# Patient Record
Sex: FEMALE | Race: BLACK OR AFRICAN AMERICAN
[De-identification: names, ages, dates, MRNs, and addresses within clinical notes are randomized per-mention and may not be internally consistent; named-entity substitution may affect disease eponyms.]

---

## 2019-03-07 ENCOUNTER — HOSPITAL ENCOUNTER (INPATIENT)
Dept: HOSPITAL 74 - JDEL | Age: 24
LOS: 5 days | Discharge: HOME | DRG: 540 | End: 2019-03-12
Attending: OBSTETRICS & GYNECOLOGY | Admitting: OBSTETRICS & GYNECOLOGY
Payer: COMMERCIAL

## 2019-03-07 VITALS — BODY MASS INDEX: 31 KG/M2

## 2019-03-07 DIAGNOSIS — Z3A.32: ICD-10-CM

## 2019-03-07 DIAGNOSIS — O30.033: ICD-10-CM

## 2019-03-07 LAB
BASOPHILS # BLD: 0.3 % (ref 0–2)
DEPRECATED RDW RBC AUTO: 15 % (ref 11.6–15.6)
EOSINOPHIL # BLD: 1 % (ref 0–4.5)
HCT VFR BLD CALC: 33.1 % (ref 32.4–45.2)
HGB BLD-MCNC: 10.9 GM/DL (ref 10.7–15.3)
LYMPHOCYTES # BLD: 13.2 % (ref 8–40)
MCH RBC QN AUTO: 30 PG (ref 25.7–33.7)
MCHC RBC AUTO-ENTMCNC: 33 G/DL (ref 32–36)
MCV RBC: 91.1 FL (ref 80–96)
MONOCYTES # BLD AUTO: 8.2 % (ref 3.8–10.2)
NEUTROPHILS # BLD: 77.3 % (ref 42.8–82.8)
PLATELET # BLD AUTO: 150 K/MM3 (ref 134–434)
PMV BLD: 11 FL (ref 7.5–11.1)
RBC # BLD AUTO: 3.63 M/MM3 (ref 3.6–5.2)
WBC # BLD AUTO: 11.8 K/MM3 (ref 4–10)

## 2019-03-07 RX ADMIN — BETAMETHASONE ACETATE AND BETAMETHASONE SODIUM PHOSPHATE SCH MG: 3; 3 INJECTION, SUSPENSION INTRA-ARTICULAR; INTRALESIONAL; INTRAMUSCULAR; SOFT TISSUE at 23:20

## 2019-03-07 NOTE — HP
Past Medical History





- Admission


Chief Complaint: back pain, vaginal bleeding, fall earlier this week


History of Present Illness: 





22 yo  with mono di twins at 32.1 weeks c/o on and off vaginal bleeding 

throughout pregnancy that restarted today.  she also c/o back pain and a fall 

earlier this werk on ice.  no lof/ctx.  +fm


History Source: Patient, Medical Record


Limitations to Obtaining History: No Limitations





- Past Medical History


Cardiovascular: No: HTN


Pulmonary: No: COPD


...: 1


... Weeks Gestation by Dates: 32.1


Infectious Disease: No: HIV, MRSA, STD's


Psych: No: Bipolar, Depression


Endocrine: Yes: Hypothyroidism





- Past Surgical History


Past Surgical History: Yes: None


Hx Myomectomy: No


Hx Transabdominal Cerclage: No





- Smoking History


Have you smoked in the past 12 months: No





- Alcohol/Substance Use


History of Substance Use: reports: None





- Social History


History of Recent Travel: No





Home Medications





- Allergies


Allergies/Adverse Reactions: 


 Allergies











Allergy/AdvReac Type Severity Reaction Status Date / Time


 


No Known Allergies Allergy   Verified 19 23:15














Review of Systems





- Review of Systems


Constitutional: reports: No Symptoms


Eyes: reports: No Symptoms


HENT: reports: No Symptoms


Neck: reports: No Symptoms


Cardiovascular: reports: No Symptoms


Respiratory: reports: No Symptoms


Gastrointestinal: reports: No Symptoms


Genitourinary: reports: Vaginal Bleeding


Breasts: reports: No Symptoms Reported


Musculoskeletal: reports: No Symptoms


Integumentary: reports: No Symptoms


Neurological: reports: No Symptoms


Endocrine: reports: No Symptoms


Hematology/Lymphatic: reports: No Symptoms


Psychiatric: reports: No Symptoms





Physical Exam - Maternity


Constitutional: Yes: Well Nourished, No Distress, Calm


Eyes: Yes: Conjunctiva Clear, EOM Intact


HENT: Yes: Normocephalic


Neck: Yes: Supple


Cardiovascular: Yes: Regular Rate and Rhythm


Lungs: Clear to auscultation





- Abdominal Exam/OB


Number of Fetuses: Multiple


Category: I





- Vaginal Exam/OB


Dilatation (cm): 1


Effacement (%): 90


Fetal Presentation: Vertex/Position (exam per nursing staff)


Fetal Station: -3





- Physical Exam


Psychiatric: Yes: Alert, Oriented





Hemorrhage Risk Assessment





- Risk Factors


Medium Risk Factors: Yes: None


High Risk Factors: Yes: None


Risk Score: 1


Risk Level: Medium Risk





Problem List





- Problems


(1) Monochorionic diamniotic twin gestation


Code(s): O30.039 - TWIN PREGNANCY, MONOCHORIONIC/DIAMNIOTIC, UNSP TRIMESTER   





Assessment/Plan





mono/di twins at 32.1 weeks   labor 


admit to l&d


steroids for fetal lung maturity


gbs prophylaxis


possible ROM to get ultrasound (nitrizine not accurate due to VB) for flako to 

eval for possible PPROM


continuous monitoring


npo for now

## 2019-03-08 LAB
ANION GAP SERPL CALC-SCNC: 8 MMOL/L (ref 8–16)
APTT BLD: 27.8 SECONDS (ref 25.2–36.5)
ARTERIAL BLOOD GAS PCO2: 53 MMHG (ref 35–45)
ARTERIAL BLOOD GAS PCO2: 54.5 MMHG (ref 35–45)
BASE EXCESS BLDA CALC-SCNC: -3.7 MEQ/L (ref -2–2)
BASE EXCESS BLDA CALC-SCNC: -7.4 MEQ/L (ref -2–2)
BASOPHILS # BLD: 0.2 % (ref 0–2)
BUN SERPL-MCNC: 4 MG/DL (ref 7–18)
CALCIUM SERPL-MCNC: 8.3 MG/DL (ref 8.5–10.1)
CHLORIDE SERPL-SCNC: 107 MMOL/L (ref 98–107)
CO2 SERPL-SCNC: 23 MMOL/L (ref 21–32)
CREAT SERPL-MCNC: 0.4 MG/DL (ref 0.55–1.3)
DEPRECATED RDW RBC AUTO: 15.2 % (ref 11.6–15.6)
EOSINOPHIL # BLD: 0.1 % (ref 0–4.5)
GLUCOSE SERPL-MCNC: 91 MG/DL (ref 74–106)
HCT VFR BLD CALC: 32.6 % (ref 32.4–45.2)
HGB BLD-MCNC: 10.9 GM/DL (ref 10.7–15.3)
INR BLD: 0.95 (ref 0.83–1.09)
LYMPHOCYTES # BLD: 5.3 % (ref 8–40)
MCH RBC QN AUTO: 30.4 PG (ref 25.7–33.7)
MCHC RBC AUTO-ENTMCNC: 33.3 G/DL (ref 32–36)
MCV RBC: 91.1 FL (ref 80–96)
MONOCYTES # BLD AUTO: 5.1 % (ref 3.8–10.2)
NEUTROPHILS # BLD: 89.3 % (ref 42.8–82.8)
PH BLDV: 7.24 [PH] (ref 7.32–7.42)
PH BLDV: 7.25 [PH] (ref 7.32–7.42)
PLATELET # BLD AUTO: 167 K/MM3 (ref 134–434)
PMV BLD: 12 FL (ref 7.5–11.1)
PO2 BLDA: 18 MMHG (ref 80–100)
PO2 BLDA: 18.7 MMHG (ref 80–100)
POTASSIUM SERPLBLD-SCNC: 3.5 MMOL/L (ref 3.5–5.1)
PT PNL PPP: 11.2 SEC (ref 9.7–13)
RBC # BLD AUTO: 3.58 M/MM3 (ref 3.6–5.2)
SAO2 % BLDA: 17.6 % (ref 90–98.9)
SAO2 % BLDA: 20.6 % (ref 90–98.9)
SODIUM SERPL-SCNC: 138 MMOL/L (ref 136–145)
VENOUS PC02: 58.8 MMHG (ref 38–52)
VENOUS PC02: 59.3 MMHG (ref 38–52)
VENOUS PO2: 12.7 MMHG (ref 28–48)
VENOUS PO2: 15 MMHG (ref 28–48)
WBC # BLD AUTO: 19.8 K/MM3 (ref 4–10)

## 2019-03-08 RX ADMIN — Medication SCH MLS/HR: at 11:00

## 2019-03-08 RX ADMIN — Medication SCH MLS/HR: at 01:00

## 2019-03-08 RX ADMIN — FERROUS SULFATE TAB EC 324 MG (65 MG FE EQUIVALENT) SCH MG: 324 (65 FE) TABLET DELAYED RESPONSE at 23:59

## 2019-03-08 RX ADMIN — LEVOTHYROXINE SODIUM SCH MCG: 25 TABLET ORAL at 06:13

## 2019-03-08 RX ADMIN — FERROUS SULFATE TAB EC 324 MG (65 MG FE EQUIVALENT) SCH MG: 324 (65 FE) TABLET DELAYED RESPONSE at 10:48

## 2019-03-08 NOTE — OP
Operative Note





- Note:


Operative Date: 19


Pre-Operative Diagnosis: mono/di twin IUP at 32.1 weeks, vaginal bleeding, 

 labor


Operation: primary LTCS


Findings: 





live female  X 2





Post-Operative Diagnosis: Same as Pre-op


Surgeon: Savita Marie


Assistant: Miguel Angel Tovar


Anesthesiologist/CRNA: Jatinder Brandon


Anesthesia: Spinal


Specimens Removed: placenta, fetal cord blood gases, fetal cord blood


Estimated Blood Loss (mls): 600 (unknown amt of blood loss prior to procedure (

estimated 250cc prior to delivery))


Operative Report Dictated: Yes

## 2019-03-08 NOTE — PN
Ante-Partal Exam





- Subjective


Subjective: 





Pt with continued VB.


Laborist evaluation of pt showed cevical dilation 4cm and significant VB. 


Bleeding: Yes


Bleeding Description: Moderate


Headache: No


Visual changes: No


Right upper quadrant pain: No


Pain (scale 1-10): 4





- Contractions


Contractions: Yes


Regularity: Regular


Intensity: Mod/Strong


Fetal Monitor Mode: External





- Exam during Labor


Fetal Heart Rate: 150 (for both babies)


Variability: Moderate


Category: I


Fetal Monitor Accelerations: Present


Fetal Monitor Decelerations: None


Exam: Vaginal


Dilatation (cm): 4


Fetal Presentation: Vertex


Remarks: 





exam per laborist at  hospital that evening





- Assessment/Plan


Assessment/Plan: 





22 y/o  with mono di twin IUP at 32.1 weeks, active  labor and 

moderate vaginal bleeding


plan for c section


NPO


Silva


anesthesia/neonatology aware


consents signed

## 2019-03-08 NOTE — OP
DATE OF OPERATION:  2019

 

PREOPERATIVE DIAGNOSIS:  Monochorionic-diamniotic twin intrauterine pregnancy 
at 32.1

weeks' gestation,  contractions,  labor, vaginal bleeding.

 

POSTOPERATIVE DIAGNOSIS:  Monochorionic-diamniotic twin intrauterine pregnancy 
at

32.1 weeks' gestation,  contractions,  labor, vaginal bleeding.

 

PROCEDURE:  Primary low transverse  section.

 

SURGEON:  Savita Marie DO

 

ANESTHESIA:  Spinal by Dr. Brandon.

 

ASSISTANT:  MARY ANNE Cano

 

COMPLICATIONS:  None.

 

ESTIMATED BLOOD LOSS:  600 mL 

 

FINDINGS: Live female infant x2.

 

SPONGE, NEEDLE, AND INSTRUMENT COUNT:  Correct.

 

DISPOSITION:  Stable to PACU.

 

BRIEF HISTORY AND PROCEDURE:  The patient is a 23-year-old  female at 32.1

weeks' gestation with a monochorionic-diamniotic twin intrauterine pregnancy who

arrived to Labor and Delivery with complaints of back pain and vaginal 
bleeding.  The

patient had been examined and found to be 1 cm dilated at that time.  The 
patient was

rick every 1 to 2 minutes.  After IV fluids were started, a dose of

betamethasone was given and an ultrasound examination showed fetus A in the 
vertex

position and normal PINEDA for both fetuses.  The patient continued to contract

painfully and found to be 4 cm dilated approximately 2 hours after admission at 
which

point it was decided to proceed with a  delivery secondary to  
labor

and the vaginal bleeding which appeared to be moderate at this time.  It did not

appear that tocolysis would be a safe option due to the vaginal bleeding.  The 
patient was counseled on her

options and she agreed with the  delivery and she signed consents for 
the

procedure.

 

She was then taken back to the operating room and given spinal anesthesia by Dr. Brandon.  She was placed in the dorsal supine position.  A Silva catheter was

placed under sterile conditions.  She was prepped and draped in the usual 
sterile

fashion.  A hard timeout was performed.  A Pfannenstiel skin incision was 
created in

the skin using a scalpel and carried down to the underlying layer of rectus 
fascia

sharply.  The fascia was incised on either side of the midline sharply and the

fascial incision was extended in superolateral direction sharply.  The fascia 
was

tented upward and dissected off the underlying layer of rectus muscle sharply.  
The

musculature was identified in the midline and retracted laterally and the 
peritoneum

was entered bluntly and carefully dissected to allow for adequate room for fetal

delivery.  A transverse incision was created in the lower uterine segment which 
was

extended in the superolateral direction bluntly.  The infant A was delivered in 
cephalic 

presentation  from left occiput transverse position without difficulty.  The 
cord was clamped twice and

cut between.  The infant was taken to the neonatology staff who were present 
for the

entire delivery.  Apgar scores of 8 and 9 were given.  Attention was then 
turned to

fetus B.  The amniotic sac was ruptured and appeared to be transverse in lie.  
Fetal

feet were able to be palpated and elevated out of the abdomen for a footling 
breach

delivery.  After elevation of the fetal feet, the baby's left arm delivered 
with ease

and slight fundal pressure was given in order to deliver up to the level of the 
axilla.  At this point, the

fetus's right arm was carefully swept across the chest anteriorly in a flexed 
position for

delivery and the fetal head was delivered in a flexed position with gentle 
traction

and fundal pressure.  The cord was clamped twice and cut in between.  The

infant was taken over to the neonatology staff where Apgar scores of 7 and 9 
were

given.  The placenta was then delivered intact.  Cord blood gases were 
collected. 

The uterus was exteriorized from the abdomen.  The hysterotomy was 
reapproximated

using 1 Vicryl in a running locked fashion, second layer using 0 Biosyn in a 
running

locked fashion.  Excellent hemostasis was achieved.  Bilateral tubes and 
ovaries were

noted to be normal.  The posterior cul-de-sac was suctioned.  The uterus was 
placed

back into the abdomen.  Bilateral gutters were inspected and cleared of all 
amniotic

membrane, blood clot and debris.  The hysterotomy was again noted to be 
hemostatic. 

The peritoneum was reapproximated using 2-0 chromic in a running fashion.  The

musculature was reapproximated using two interrupted sutures of 0 Biosyn.  The 
fascia

was reapproximated using 1 Vicryl in a running fashion.  The subcutaneous 
tissue was

reapproximated with 1 Vicryl in a running fashion.  The skin was reapproximated 
in

subcuticular fashion using 3-0 Vicryl.  Steri-Strips were then applied.  The 
patient

tolerated the procedure well and recovering in stable condition in the PACU at 
this

time.

 

Sponge, needle and instrument counts were reported correct at the end of the 
case. 

 

SAVITA MARIE DO

 

/[id number]

DD: 2019 01:44

DT: 2019 06:54

Job #:  [id number]

MTDD

## 2019-03-08 NOTE — PN
Progress Note (short form)





- Note


Progress Note: 





Anesthesia post op note,


S/p  for zenobia labor, with twins, under spinal anesthesia with 

duramorph. 


Pat seen and examined. ambulating. Vss. No complaints. VSS. No apparent post 

anesthesia complications.

## 2019-03-09 LAB
ALBUMIN SERPL-MCNC: 2.9 G/DL (ref 3.4–5)
ALP SERPL-CCNC: 115 U/L (ref 45–117)
ALT SERPL-CCNC: 14 U/L (ref 13–61)
ANION GAP SERPL CALC-SCNC: 7 MMOL/L (ref 8–16)
AST SERPL-CCNC: 17 U/L (ref 15–37)
BASOPHILS # BLD: 0.2 % (ref 0–2)
BILIRUB SERPL-MCNC: 0.6 MG/DL (ref 0.2–1)
BUN SERPL-MCNC: 6 MG/DL (ref 7–18)
CALCIUM SERPL-MCNC: 8.4 MG/DL (ref 8.5–10.1)
CHLORIDE SERPL-SCNC: 106 MMOL/L (ref 98–107)
CO2 SERPL-SCNC: 25 MMOL/L (ref 21–32)
CREAT SERPL-MCNC: 0.7 MG/DL (ref 0.55–1.3)
DEPRECATED RDW RBC AUTO: 14.7 % (ref 11.6–15.6)
EOSINOPHIL # BLD: 0.2 % (ref 0–4.5)
GLUCOSE SERPL-MCNC: 86 MG/DL (ref 74–106)
HCT VFR BLD CALC: 30.4 % (ref 32.4–45.2)
HGB BLD-MCNC: 10.1 GM/DL (ref 10.7–15.3)
LYMPHOCYTES # BLD: 10.8 % (ref 8–40)
MCH RBC QN AUTO: 29.9 PG (ref 25.7–33.7)
MCHC RBC AUTO-ENTMCNC: 33.3 G/DL (ref 32–36)
MCV RBC: 89.8 FL (ref 80–96)
MONOCYTES # BLD AUTO: 6.9 % (ref 3.8–10.2)
NEUTROPHILS # BLD: 81.9 % (ref 42.8–82.8)
PLATELET # BLD AUTO: 157 K/MM3 (ref 134–434)
PMV BLD: 11 FL (ref 7.5–11.1)
POTASSIUM SERPLBLD-SCNC: 3.8 MMOL/L (ref 3.5–5.1)
PROT SERPL-MCNC: 6.1 G/DL (ref 6.4–8.2)
RBC # BLD AUTO: 3.38 M/MM3 (ref 3.6–5.2)
SODIUM SERPL-SCNC: 137 MMOL/L (ref 136–145)
URATE SERPL-SCNC: 4.6 MG/DL (ref 2.6–7.2)
WBC # BLD AUTO: 17.4 K/MM3 (ref 4–10)

## 2019-03-09 RX ADMIN — ACETAMINOPHEN PRN MG: 325 TABLET ORAL at 03:38

## 2019-03-09 RX ADMIN — FERROUS SULFATE TAB EC 324 MG (65 MG FE EQUIVALENT) SCH MG: 324 (65 FE) TABLET DELAYED RESPONSE at 21:22

## 2019-03-09 RX ADMIN — SIMETHICONE CHEW TAB 80 MG PRN MG: 80 TABLET ORAL at 21:22

## 2019-03-09 RX ADMIN — BETAMETHASONE ACETATE AND BETAMETHASONE SODIUM PHOSPHATE SCH: 3; 3 INJECTION, SUSPENSION INTRA-ARTICULAR; INTRALESIONAL; INTRAMUSCULAR; SOFT TISSUE at 00:30

## 2019-03-09 RX ADMIN — ACETAMINOPHEN PRN MG: 325 TABLET ORAL at 21:22

## 2019-03-09 RX ADMIN — FERROUS SULFATE TAB EC 324 MG (65 MG FE EQUIVALENT) SCH MG: 324 (65 FE) TABLET DELAYED RESPONSE at 09:24

## 2019-03-09 RX ADMIN — DOCUSATE SODIUM,SENNOSIDES PRN TABLET: 50; 8.6 TABLET, FILM COATED ORAL at 22:01

## 2019-03-09 RX ADMIN — SIMETHICONE CHEW TAB 80 MG PRN MG: 80 TABLET ORAL at 10:06

## 2019-03-09 RX ADMIN — LABETALOL HCL SCH MG: 100 TABLET, FILM COATED ORAL at 14:07

## 2019-03-09 RX ADMIN — LABETALOL HCL SCH MG: 100 TABLET, FILM COATED ORAL at 21:22

## 2019-03-09 RX ADMIN — LEVOTHYROXINE SODIUM SCH MCG: 25 TABLET ORAL at 06:32

## 2019-03-09 RX ADMIN — ACETAMINOPHEN PRN MG: 325 TABLET ORAL at 10:06

## 2019-03-09 RX ADMIN — SIMETHICONE CHEW TAB 80 MG PRN MG: 80 TABLET ORAL at 03:37

## 2019-03-09 NOTE — PN
Post Partum Progress Note





- Subjective


Subjective: 





22 yo Para 1 status post primary , seen and evaluated.


She c/o gas pain. Dressing is intact.


She denies any headache, blurry vision nor epigastric pain.


Post Partum Day: 1


Type of Delivery: Primary C/S


Vital Signs: 


 Vital Signs











Temperature  98.7 F   19 07:30


 


Pulse Rate  93 H  19 07:30


 


Respiratory Rate  18   19 07:30


 


Blood Pressure  153/98   19 07:30


 


O2 Sat by Pulse Oximetry (%)  98   19 02:45











Breast Exam: Yes: Soft


Uterus: Yes: Fundus @ umbilicus


Incision: Yes: Dressing dry and intact


Abdomen/GI: Yes: Abdominal Distention


Lochia: Yes: Rubra


Lochia, amount: Small


Extremities: Yes: Calves non-tender


Activity: Other (She's out of bed to chair)





- Labs


Labs: 


 CBC











WBC  17.4 K/mm3 (4.0-10.0)  H  19  07:00    


 


RBC  3.38 M/mm3 (3.60-5.2)  L  19  07:00    


 


Hgb  10.1 GM/dL (10.7-15.3)  L  19  07:00    


 


Hct  30.4 % (32.4-45.2)  L  19  07:00    


 


MCV  89.8 fl (80-96)   19  07:00    


 


MCH  29.9 pg (25.7-33.7)   19  07:00    


 


MCHC  33.3 g/dl (32.0-36.0)   19  07:00    


 


RDW  14.7 % (11.6-15.6)   19  07:00    


 


Plt Count  157 K/MM3 (134-434)   19  07:00    


 


MPV  11.0 fl (7.5-11.1)   19  07:00    


 


Absolute Neuts (auto)  14.2 K/mm3 (1.5-8.0)  H  19  07:00    


 


Neutrophils %  81.9 % (42.8-82.8)   19  07:00    


 


Lymphocytes %  10.8 % (8-40)  D 19  07:00    


 


Monocytes %  6.9 % (3.8-10.2)   19  07:00    


 


Eosinophils %  0.2 % (0-4.5)  D 19  07:00    


 


Basophils %  0.2 % (0-2.0)   19  07:00    


 


Nucleated RBC %  0 % (0-0)   19  07:00    














Problem List





- Problems


(1) Status post primary low transverse  section


Code(s): Z98.891 - HISTORY OF UTERINE SCAR FROM PREVIOUS SURGERY   





Assessment/Plan





Status post 


Ambulation


R/O PIH


Labetalol 


Analgesia as needed

## 2019-03-10 RX ADMIN — FERROUS SULFATE TAB EC 324 MG (65 MG FE EQUIVALENT) SCH MG: 324 (65 FE) TABLET DELAYED RESPONSE at 21:01

## 2019-03-10 RX ADMIN — LABETALOL HCL SCH MG: 100 TABLET, FILM COATED ORAL at 14:04

## 2019-03-10 RX ADMIN — LABETALOL HCL SCH MG: 100 TABLET, FILM COATED ORAL at 06:22

## 2019-03-10 RX ADMIN — LEVOTHYROXINE SODIUM SCH MCG: 25 TABLET ORAL at 06:22

## 2019-03-10 RX ADMIN — DOCUSATE SODIUM,SENNOSIDES PRN TABLET: 50; 8.6 TABLET, FILM COATED ORAL at 21:02

## 2019-03-10 RX ADMIN — ACETAMINOPHEN PRN MG: 325 TABLET ORAL at 06:23

## 2019-03-10 RX ADMIN — ACETAMINOPHEN PRN MG: 325 TABLET ORAL at 13:37

## 2019-03-10 RX ADMIN — SIMETHICONE CHEW TAB 80 MG PRN MG: 80 TABLET ORAL at 21:00

## 2019-03-10 RX ADMIN — SIMETHICONE CHEW TAB 80 MG PRN MG: 80 TABLET ORAL at 13:37

## 2019-03-10 RX ADMIN — SIMETHICONE CHEW TAB 80 MG PRN MG: 80 TABLET ORAL at 06:22

## 2019-03-10 RX ADMIN — LABETALOL HCL SCH MG: 100 TABLET, FILM COATED ORAL at 21:01

## 2019-03-10 RX ADMIN — ACETAMINOPHEN PRN MG: 325 TABLET ORAL at 21:00

## 2019-03-10 RX ADMIN — FERROUS SULFATE TAB EC 324 MG (65 MG FE EQUIVALENT) SCH MG: 324 (65 FE) TABLET DELAYED RESPONSE at 09:05

## 2019-03-10 NOTE — PN
Post Partum Progress Note





- Subjective


Subjective: 





24 yo Para 1 status post primary , seen and evaluated.


Doing well.


Post Partum Day: 2


Type of Delivery: Primary C/S


Vital Signs: 


 Vital Signs











Temperature  98.8 F   03/10/19 01:57


 


Pulse Rate  96 H  03/10/19 06:24


 


Respiratory Rate  18   03/10/19 06:24


 


Blood Pressure  121/96   03/10/19 06:24


 


O2 Sat by Pulse Oximetry (%)  98   19 21:42











Breast Exam: Yes: Soft


Uterus: Yes: Fundus Firm


Incision: Yes: Dressing dry and intact


Abdomen/GI: Yes: Abdomen soft, Tolerating PO


Lochia: Yes: Rubra


Lochia, amount: Small


Extremities: Yes: Calves non-tender


Activity: Ambulating





- Labs


Labs: 


 CBC











WBC  17.4 K/mm3 (4.0-10.0)  H  19  07:00    


 


RBC  3.38 M/mm3 (3.60-5.2)  L  19  07:00    


 


Hgb  10.1 GM/dL (10.7-15.3)  L  19  07:00    


 


Hct  30.4 % (32.4-45.2)  L  19  07:00    


 


MCV  89.8 fl (80-96)   19  07:00    


 


MCH  29.9 pg (25.7-33.7)   19  07:00    


 


MCHC  33.3 g/dl (32.0-36.0)   19  07:00    


 


RDW  14.7 % (11.6-15.6)   19  07:00    


 


Plt Count  157 K/MM3 (134-434)   19  07:00    


 


MPV  11.0 fl (7.5-11.1)   19  07:00    


 


Absolute Neuts (auto)  14.2 K/mm3 (1.5-8.0)  H  19  07:00    


 


Neutrophils %  81.9 % (42.8-82.8)   19  07:00    


 


Lymphocytes %  10.8 % (8-40)  D 19  07:00    


 


Monocytes %  6.9 % (3.8-10.2)   19  07:00    


 


Eosinophils %  0.2 % (0-4.5)  D 19  07:00    


 


Basophils %  0.2 % (0-2.0)   19  07:00    


 


Nucleated RBC %  0 % (0-0)   19  07:00    














Problem List





- Problems


(1) Status post primary low transverse  section


Code(s): Z98.891 - HISTORY OF UTERINE SCAR FROM PREVIOUS SURGERY   





Assessment/Plan





Status post 


Ambulation


Analgesia as needed


Continue postpartum care

## 2019-03-11 LAB
BASOPHILS # BLD: 0.6 % (ref 0–2)
DEPRECATED RDW RBC AUTO: 14.9 % (ref 11.6–15.6)
EOSINOPHIL # BLD: 1.9 % (ref 0–4.5)
HCT VFR BLD CALC: 29.2 % (ref 32.4–45.2)
HGB BLD-MCNC: 9.9 GM/DL (ref 10.7–15.3)
LYMPHOCYTES # BLD: 21.6 % (ref 8–40)
MCH RBC QN AUTO: 30.7 PG (ref 25.7–33.7)
MCHC RBC AUTO-ENTMCNC: 33.8 G/DL (ref 32–36)
MCV RBC: 90.9 FL (ref 80–96)
MONOCYTES # BLD AUTO: 8.2 % (ref 3.8–10.2)
NEUTROPHILS # BLD: 67.7 % (ref 42.8–82.8)
PLATELET # BLD AUTO: 187 K/MM3 (ref 134–434)
PMV BLD: 10.6 FL (ref 7.5–11.1)
RBC # BLD AUTO: 3.21 M/MM3 (ref 3.6–5.2)
WBC # BLD AUTO: 10.5 K/MM3 (ref 4–10)

## 2019-03-11 RX ADMIN — LABETALOL HCL SCH MG: 100 TABLET, FILM COATED ORAL at 14:08

## 2019-03-11 RX ADMIN — ACETAMINOPHEN PRN MG: 325 TABLET ORAL at 10:09

## 2019-03-11 RX ADMIN — SIMETHICONE CHEW TAB 80 MG PRN MG: 80 TABLET ORAL at 19:37

## 2019-03-11 RX ADMIN — ACETAMINOPHEN PRN MG: 325 TABLET ORAL at 01:17

## 2019-03-11 RX ADMIN — ACETAMINOPHEN PRN MG: 325 TABLET ORAL at 19:38

## 2019-03-11 RX ADMIN — SIMETHICONE CHEW TAB 80 MG PRN MG: 80 TABLET ORAL at 01:17

## 2019-03-11 RX ADMIN — LABETALOL HCL SCH MG: 100 TABLET, FILM COATED ORAL at 06:14

## 2019-03-11 RX ADMIN — LEVOTHYROXINE SODIUM SCH MCG: 25 TABLET ORAL at 06:14

## 2019-03-11 RX ADMIN — SIMETHICONE CHEW TAB 80 MG PRN MG: 80 TABLET ORAL at 10:12

## 2019-03-11 RX ADMIN — FERROUS SULFATE TAB EC 324 MG (65 MG FE EQUIVALENT) SCH MG: 324 (65 FE) TABLET DELAYED RESPONSE at 10:12

## 2019-03-11 RX ADMIN — FERROUS SULFATE TAB EC 324 MG (65 MG FE EQUIVALENT) SCH MG: 324 (65 FE) TABLET DELAYED RESPONSE at 21:55

## 2019-03-11 RX ADMIN — LABETALOL HCL SCH MG: 100 TABLET, FILM COATED ORAL at 21:55

## 2019-03-11 NOTE — PN
Post Partum Progress Note





- Subjective


Subjective: 





22 yo Para1 ( Twin ), status post primary , seen and evaluated.


She c/o feeling gas pain.


Post Partum Day: 3


Type of Delivery: Primary C/S


Vital Signs: 


 Vital Signs











Temperature  98.1 F   03/10/19 21:00


 


Pulse Rate  86   19 06:00


 


Respiratory Rate  16   19 06:00


 


Blood Pressure  130/97   19 06:00


 


O2 Sat by Pulse Oximetry (%)  98   19 21:42











Breast Exam: Yes: Soft


Uterus: Yes: Fundus Firm


Incision: Yes: Sutures intact


Abdomen/GI: Yes: Abdomen soft, Tolerating PO


Lochia: Yes: Rubra


Lochia, amount: Small


Extremities: Yes: Calves non-tender


Activity: Ambulating





- Labs


Labs: 


 CBC











WBC  10.5 K/mm3 (4.0-10.0)  H  19  06:10    


 


RBC  3.21 M/mm3 (3.60-5.2)  L  19  06:10    


 


Hgb  9.9 GM/dL (10.7-15.3)  L  19  06:10    


 


Hct  29.2 % (32.4-45.2)  L  19  06:10    


 


MCV  90.9 fl (80-96)   19  06:10    


 


MCH  30.7 pg (25.7-33.7)   19  06:10    


 


MCHC  33.8 g/dl (32.0-36.0)   19  06:10    


 


RDW  14.9 % (11.6-15.6)   19  06:10    


 


Plt Count  187 K/MM3 (134-434)   19  06:10    


 


MPV  10.6 fl (7.5-11.1)   19  06:10    


 


Absolute Neuts (auto)  7.1 K/mm3 (1.5-8.0)   19  06:10    


 


Neutrophils %  67.7 % (42.8-82.8)   19  06:10    


 


Lymphocytes %  21.6 % (8-40)  D 19  06:10    


 


Monocytes %  8.2 % (3.8-10.2)   19  06:10    


 


Eosinophils %  1.9 % (0-4.5)  D 19  06:10    


 


Basophils %  0.6 % (0-2.0)   19  06:10    


 


Nucleated RBC %  0 % (0-0)   19  06:10    














Problem List





- Problems


(1) Status post primary low transverse  section


Code(s): Z98.891 - HISTORY OF UTERINE SCAR FROM PREVIOUS SURGERY   





Assessment/Plan





Status post 


Gas pain


Ambulation


Analgesia as needed


Ducolax or enema as needed


Continue postpartum care

## 2019-03-12 VITALS — DIASTOLIC BLOOD PRESSURE: 84 MMHG | SYSTOLIC BLOOD PRESSURE: 138 MMHG | TEMPERATURE: 98.4 F | HEART RATE: 72 BPM

## 2019-03-12 RX ADMIN — ACETAMINOPHEN PRN MG: 325 TABLET ORAL at 06:17

## 2019-03-12 RX ADMIN — LABETALOL HCL SCH MG: 100 TABLET, FILM COATED ORAL at 06:17

## 2019-03-12 RX ADMIN — LEVOTHYROXINE SODIUM SCH MCG: 25 TABLET ORAL at 06:17

## 2019-03-12 RX ADMIN — SIMETHICONE CHEW TAB 80 MG PRN MG: 80 TABLET ORAL at 00:25

## 2019-03-12 RX ADMIN — FERROUS SULFATE TAB EC 324 MG (65 MG FE EQUIVALENT) SCH MG: 324 (65 FE) TABLET DELAYED RESPONSE at 09:10

## 2019-03-12 RX ADMIN — ACETAMINOPHEN PRN MG: 325 TABLET ORAL at 00:26

## 2019-03-12 RX ADMIN — SIMETHICONE CHEW TAB 80 MG PRN MG: 80 TABLET ORAL at 06:17

## 2019-03-12 NOTE — DS
Physical Exam-GYN


Vital Signs: 


 Vital Signs











Temperature  98.8 F   19 06:00


 


Pulse Rate  84   19 06:00


 


Respiratory Rate  20   19 06:00


 


Blood Pressure  138/88   19 06:00


 


O2 Sat by Pulse Oximetry (%)  98   19 21:42











Constitutional: Yes: Well Nourished


Eyes: Yes: Conjunctiva Clear


HENT: Yes: Atraumatic


Neck: Yes: Supple


Cardiovascular: Yes: Regular Rate and Rhythm


Respiratory: Yes: Regular


Gastrointestinal: Yes: Normal Bowel Sounds


Pelvis: Yes: WNL


External Genitalia: Yes: Normal


Vaginal Exam: Yes: Normal


Wound/Incision: Yes: Sutures Intact


Neurological: Yes: Alert, Oriented


Psychiatric: Yes: Alert, Oriented


Labs: 


 CBC, BMP





 19 06:10 





 19 14:00 











Delivery





- Delivery


Type of Anesthesia: Spinal


Episiotomy/Laceration: None


EBL (cc): 600





Delivery, Single Birth





-  Feeding Plan


Initial Plan: Exclusive breastfeeding throughout hospitalization





Delivery, Multiple Births





- Condition of Multiple Births


  **  1 (A)


Pediatrician/Neonatologist Present: Yes


Pediatrician: Estephanie Jang


Infant Gender: Female


Birth Weight: 3 lb 8 oz


Position: Left, OT


Total Hours ROM (HRS/MINS): 0Hrs/1Min





  **  2 (B)


Pediatrician/Neonatologist Present: Yes


Pediatrician: Estephanie Jang


Infant Gender: Female


Birth Weight: 3 lb 11 oz


Total Hours ROM (HRS/MINS): 0Hrs/4Mins





- Apgar


  **  1 (A) 1 Minute


Apgar Score: 8





  ** [place] 1 (A) 5 Minutes


Apgar Score: 9





  **  2 (B) 1 Minute


Apgar Score: 7





  ** [place] 2 (B) 5 Minutes


Apgar Score: 9





Discharge Summary


Reason For Visit: LABOR ADMIT


Current Active Problems





Monochorionic diamniotic twin gestation (Acute)


Status post primary low transverse  section (Acute)








Procedures: Principal: Primary Low Transverse 


Hospital Course: 





Patient delivered twins by .


She was prescribed Labetalol for high blood blood pressure.


No other complications.


Condition: Stable





- Instructions


Diet, Activity, Other Instructions: 


Regular diet


No driving, no lifting x 4 weeks


F/U with MD in 1 week


Disposition: HOME

## 2019-03-22 NOTE — PATH
Surgical Pathology Report



Patient Name:  BRISA SALDIVAR

Accession #:  [id number]

[facility]. Rec. #:  [id number]                                                        

   /Age/Gender:  1995 (Age: 23) / F

Account:  [id number]                                                          

             Location: [facility] OBS/GYN

Taken:  3/8/2019

Received:  3/8/2019

Reported:  3/22/2019

Physicians:  Savita Marie M.D.

  



Specimen(s) Received

 PLACENTA 





Clinical History

, 32.2 weeks, twin gestation

DL/3/21/2019







Final Diagnosis

PLACENTA:

DIAMNIOTIC, MONOCHORIONIC, FUSED, TWIN, THIRD TRIMESTER PLACENTA.

PLACENTA "A" SHOWS A THREE-VESSELS CORD AND MEMBRANES WITH NO DIAGNOSTIC

ABNORMALITIES.

PLACENTA "B" WITH ONE CHORANGIOMA, MEASURING AT LEAST 4MM IN LARGEST DIMENSION.

THREE-VESSELS CORD AND MEMBRANES WITH NO DIAGNOSTIC ABNORMALITIES.





***Electronically Signed***

Janet Cooper M.D.





Gross Description

Received in formalin labeled "placenta," is a twin placenta comprised of 2 fused

discs,  by dividing membranes. There is 1 clip marking the umbilical

cord arbitrarily designated placenta "A" and 2 clips marking the umbilical cord

arbitrarily designated placenta "B". The placenta is 463 g and 17.0 x 15.0 x 2.7

cm. The attached membranes are tan, translucent with focal opacities and insert

marginally. Umbilical cord "A" measures 12 cm in length a 1 cm in diameter. The

cord inserts eccentrically, 1.5 cm to the nearest margin. No true knots or

strictures are identified. Cut surface of the umbilical cord reveals 3 vessels.

Umbilical cord "B" measures 12 cm in length and averages 1 cm in diameter. The

cord inserts at the margin. No true knots or strictures are identified. Cut

surface of the umbilical cord reveals 3 vessels. The fetal surface is gray blue

with minimal fibrin deposition and appropriate caliber vessels. The maternal

surface is red-brown with focal defects. Sectioning reveals red-brown, spongy

parenchyma. No lesions are identified. Representative sections are submitted in

7 cassettes as follows: 1-placenta "A" membrane roll and umbilical cord;

2-3-full-thickness sections of placenta "A"; 4-dividing membranes; 5-placenta

"B" membrane roll and umbilical cord; 6-7-full-thickness sections of placenta

"B". 

DL/3/21/2019



saudi/3/21/2019

## 2020-12-31 ENCOUNTER — HOSPITAL ENCOUNTER (INPATIENT)
Dept: HOSPITAL 74 - JDEL | Age: 25
LOS: 3 days | Discharge: HOME | DRG: 540 | End: 2021-01-03
Attending: OBSTETRICS & GYNECOLOGY | Admitting: OBSTETRICS & GYNECOLOGY
Payer: COMMERCIAL

## 2020-12-31 VITALS — BODY MASS INDEX: 33.4 KG/M2

## 2020-12-31 DIAGNOSIS — O45.8X3: ICD-10-CM

## 2020-12-31 DIAGNOSIS — E03.9: ICD-10-CM

## 2020-12-31 DIAGNOSIS — Z3A.37: ICD-10-CM

## 2020-12-31 DIAGNOSIS — O34.219: ICD-10-CM

## 2020-12-31 PROCEDURE — U0003 INFECTIOUS AGENT DETECTION BY NUCLEIC ACID (DNA OR RNA); SEVERE ACUTE RESPIRATORY SYNDROME CORONAVIRUS 2 (SARS-COV-2) (CORONAVIRUS DISEASE [COVID-19]), AMPLIFIED PROBE TECHNIQUE, MAKING USE OF HIGH THROUGHPUT TECHNOLOGIES AS DESCRIBED BY CMS-2020-01-R: HCPCS

## 2020-12-31 PROCEDURE — C9803 HOPD COVID-19 SPEC COLLECT: HCPCS

## 2021-01-01 LAB
ANION GAP SERPL CALC-SCNC: 11 MMOL/L (ref 8–16)
APTT BLD: 27.3 SECONDS (ref 25.2–36.5)
BASE EXCESS BLDCOA CALC-SCNC: -5.4 MMOL/L (ref 0–2)
BASOPHILS # BLD: 0.3 % (ref 0–2)
BUN SERPL-MCNC: 7.2 MG/DL (ref 7–18)
CALCIUM SERPL-MCNC: 8.5 MG/DL (ref 8.5–10.1)
CHLORIDE SERPL-SCNC: 106 MMOL/L (ref 98–107)
CO2 SERPL-SCNC: 22 MMOL/L (ref 21–32)
CREAT SERPL-MCNC: 0.6 MG/DL (ref 0.55–1.3)
DEPRECATED RDW RBC AUTO: 14.1 % (ref 11.6–15.6)
EOSINOPHIL # BLD: 1.4 % (ref 0–4.5)
GLUCOSE SERPL-MCNC: 96 MG/DL (ref 74–106)
HCO3 BLDCO-SCNC: 22.3 MMHG (ref 20–29)
HCT VFR BLD CALC: 32 % (ref 32.4–45.2)
HGB BLD-MCNC: 10.3 GM/DL (ref 10.7–15.3)
INR BLD: 0.92 (ref 0.83–1.09)
LYMPHOCYTES # BLD: 23.4 % (ref 8–40)
MCH RBC QN AUTO: 28.4 PG (ref 25.7–33.7)
MCHC RBC AUTO-ENTMCNC: 32.1 G/DL (ref 32–36)
MCV RBC: 88.4 FL (ref 80–96)
MONOCYTES # BLD AUTO: 7.3 % (ref 3.8–10.2)
NEUTROPHILS # BLD: 67.6 % (ref 42.8–82.8)
PCO2 BLDCO: 49.6 MMHG (ref 30–78)
PH BLDCO: 7.27 [PH] (ref 7.14–7.44)
PLATELET # BLD AUTO: 142 K/MM3 (ref 134–434)
PMV BLD: 11.4 FL (ref 7.5–11.1)
POTASSIUM SERPLBLD-SCNC: 3.9 MMOL/L (ref 3.5–5.1)
PT PNL PPP: 11.3 SEC (ref 9.7–13)
RBC # BLD AUTO: 3.62 M/MM3 (ref 3.6–5.2)
SODIUM SERPL-SCNC: 139 MMOL/L (ref 136–145)
WBC # BLD AUTO: 8.2 K/MM3 (ref 4–10)

## 2021-01-01 RX ADMIN — AMPICILLIN SODIUM ONE MLS/HR: 2 INJECTION, POWDER, FOR SOLUTION INTRAMUSCULAR; INTRAVENOUS at 09:15

## 2021-01-01 RX ADMIN — IBUPROFEN PRN MG: 800 INJECTION INTRAVENOUS at 14:42

## 2021-01-01 RX ADMIN — AMPICILLIN SODIUM ONE MLS/HR: 2 INJECTION, POWDER, FOR SOLUTION INTRAMUSCULAR; INTRAVENOUS at 07:15

## 2021-01-01 RX ADMIN — Medication SCH MLS/HR: at 11:45

## 2021-01-02 LAB
BASOPHILS # BLD: 0.4 % (ref 0–2)
DEPRECATED RDW RBC AUTO: 14.3 % (ref 11.6–15.6)
EOSINOPHIL # BLD: 0.2 % (ref 0–4.5)
HCT VFR BLD CALC: 27.6 % (ref 32.4–45.2)
HGB BLD-MCNC: 8.9 GM/DL (ref 10.7–15.3)
LYMPHOCYTES # BLD: 13 % (ref 8–40)
MCH RBC QN AUTO: 28.7 PG (ref 25.7–33.7)
MCHC RBC AUTO-ENTMCNC: 32.3 G/DL (ref 32–36)
MCV RBC: 88.7 FL (ref 80–96)
MONOCYTES # BLD AUTO: 7.9 % (ref 3.8–10.2)
NEUTROPHILS # BLD: 78.5 % (ref 42.8–82.8)
PLATELET # BLD AUTO: 132 K/MM3 (ref 134–434)
PMV BLD: 11.7 FL (ref 7.5–11.1)
RBC # BLD AUTO: 3.11 M/MM3 (ref 3.6–5.2)
WBC # BLD AUTO: 13.1 K/MM3 (ref 4–10)

## 2021-01-02 RX ADMIN — ACETAMINOPHEN PRN MG: 325 TABLET ORAL at 15:54

## 2021-01-02 RX ADMIN — IBUPROFEN PRN MG: 600 TABLET, FILM COATED ORAL at 23:08

## 2021-01-02 RX ADMIN — SIMETHICONE CHEW TAB 80 MG PRN MG: 80 TABLET ORAL at 15:53

## 2021-01-02 RX ADMIN — ACETAMINOPHEN PRN MG: 325 TABLET ORAL at 23:08

## 2021-01-02 RX ADMIN — IBUPROFEN PRN MG: 800 INJECTION INTRAVENOUS at 06:19

## 2021-01-02 RX ADMIN — SIMETHICONE CHEW TAB 80 MG PRN MG: 80 TABLET ORAL at 23:08

## 2021-01-02 RX ADMIN — Medication SCH: at 11:45

## 2021-01-02 RX ADMIN — IBUPROFEN PRN MG: 600 TABLET, FILM COATED ORAL at 15:53

## 2021-01-03 VITALS — TEMPERATURE: 97.6 F | DIASTOLIC BLOOD PRESSURE: 81 MMHG | HEART RATE: 93 BPM | SYSTOLIC BLOOD PRESSURE: 125 MMHG

## 2021-01-03 RX ADMIN — ACETAMINOPHEN PRN MG: 325 TABLET ORAL at 05:01

## 2021-01-03 RX ADMIN — ACETAMINOPHEN PRN MG: 325 TABLET ORAL at 09:16

## 2021-01-03 RX ADMIN — SIMETHICONE CHEW TAB 80 MG PRN MG: 80 TABLET ORAL at 09:16

## 2021-01-03 RX ADMIN — IBUPROFEN PRN MG: 600 TABLET, FILM COATED ORAL at 05:02

## 2021-01-03 RX ADMIN — SIMETHICONE CHEW TAB 80 MG PRN MG: 80 TABLET ORAL at 05:01

## 2021-01-03 RX ADMIN — IBUPROFEN PRN MG: 600 TABLET, FILM COATED ORAL at 09:17
